# Patient Record
Sex: MALE | Race: WHITE | Employment: STUDENT | URBAN - METROPOLITAN AREA
[De-identification: names, ages, dates, MRNs, and addresses within clinical notes are randomized per-mention and may not be internally consistent; named-entity substitution may affect disease eponyms.]

---
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
## 2025-07-08 ENCOUNTER — OFFICE VISIT (OUTPATIENT)
Dept: FAMILY MEDICINE CLINIC | Age: 20
End: 2025-07-08
Payer: COMMERCIAL

## 2025-07-08 VITALS
DIASTOLIC BLOOD PRESSURE: 70 MMHG | BODY MASS INDEX: 27.7 KG/M2 | HEIGHT: 69 IN | WEIGHT: 187 LBS | HEART RATE: 56 BPM | SYSTOLIC BLOOD PRESSURE: 122 MMHG | RESPIRATION RATE: 12 BRPM

## 2025-07-08 DIAGNOSIS — Z00.00 WELL ADULT EXAM: Primary | ICD-10-CM

## 2025-07-08 PROCEDURE — 99385 PREV VISIT NEW AGE 18-39: CPT | Performed by: NURSE PRACTITIONER

## 2025-07-08 SDOH — ECONOMIC STABILITY: FOOD INSECURITY: WITHIN THE PAST 12 MONTHS, YOU WORRIED THAT YOUR FOOD WOULD RUN OUT BEFORE YOU GOT MONEY TO BUY MORE.: NEVER TRUE

## 2025-07-08 SDOH — ECONOMIC STABILITY: FOOD INSECURITY: WITHIN THE PAST 12 MONTHS, THE FOOD YOU BOUGHT JUST DIDN'T LAST AND YOU DIDN'T HAVE MONEY TO GET MORE.: NEVER TRUE

## 2025-07-08 ASSESSMENT — ENCOUNTER SYMPTOMS
SHORTNESS OF BREATH: 0
NAUSEA: 0
COUGH: 0
ABDOMINAL PAIN: 0

## 2025-07-08 ASSESSMENT — PATIENT HEALTH QUESTIONNAIRE - PHQ9
SUM OF ALL RESPONSES TO PHQ QUESTIONS 1-9: 0
1. LITTLE INTEREST OR PLEASURE IN DOING THINGS: NOT AT ALL
2. FEELING DOWN, DEPRESSED OR HOPELESS: NOT AT ALL
SUM OF ALL RESPONSES TO PHQ QUESTIONS 1-9: 0

## 2025-07-08 NOTE — PROGRESS NOTES
Subjective:      Patient ID: Tony Escudero 2005 is a 19 y.o. male here for evaluation.     Chief Complaint   Patient presents with    Establish Care     Previous seen in 2021    Annual Exam     Requesting labs--doing an ultra-marathon in September.  Currently in  in [place]       Denies CP, SOB or chest tightness    Will be doing an Ultra Marathon - 50 miles.  Training 30-40 miles with no issues.    Denies CP, SOB or chest tightness.  Denies joint pain.     BP wnl    Vitals:    07/08/25 0944   BP: 122/70   Pulse: 56   Resp: 12       Due for labs    No results found for: \"LABA1C\"  No results found for: \"EAG\"    No components found for: \"CHLPL\"  No results found for: \"TRIG\"  No results found for: \"HDL\"  No components found for: \"LDLCALC\"  No components found for: \"LABVLDL\"      Chemistry        Component Value Date/Time     09/14/2021 1039    K 4.5 09/14/2021 1039     09/14/2021 1039    CO2 26 09/14/2021 1039    BUN 12 09/14/2021 1039    CREATININE 1.0 09/14/2021 1039        Component Value Date/Time    CALCIUM 10.0 09/14/2021 1039            No results found for: \"TSH\", \"G0AVAVA\", \"THYROIDAB\"    Lab Results   Component Value Date    WBC 6.3 09/14/2021    HGB 15.6 09/14/2021    HCT 46.3 09/14/2021    MCV 91.9 09/14/2021     09/14/2021         Health Maintenance   Topic Date Due    Hepatitis A vaccine (2 of 2 - 2-dose series) 05/27/2014    Depression Screen  Never done    HIV screen  Never done    Meningococcal B vaccine (1 of 2 - Standard) Never done    Hepatitis C screen  Never done    COVID-19 Vaccine (3 - 2024-25 season) 09/01/2024    Flu vaccine (1) 08/01/2025    DTaP/Tdap/Td vaccine (7 - Td or Tdap) 06/01/2026    Hepatitis B vaccine  Completed    Hib vaccine  Completed    HPV vaccine  Completed    Polio vaccine  Completed    Varicella vaccine  Completed    Meningococcal (ACWY) vaccine  Completed    Pneumococcal 0-49 years Vaccine  Aged Out    Measles,Mumps,Rubella (MMR) vaccine

## 2025-07-09 LAB
ALBUMIN: 4.7 G/DL (ref 3.5–5.2)
ALK PHOSPHATASE: 60 U/L (ref 39–118)
ALT SERPL-CCNC: 14 U/L (ref 5–41)
ANION GAP SERPL CALCULATED.3IONS-SCNC: 11 MMOL/L (ref 7–16)
AST SERPL-CCNC: 18 U/L (ref 9–50)
BASOPHILS ABSOLUTE: 0.04 K/UL (ref 0–0.2)
BASOPHILS RELATIVE PERCENT: 0.9 % (ref 0–2)
BILIRUB SERPL-MCNC: 0.7 MG/DL
BUN BLDV-MCNC: 20 MG/DL (ref 6–20)
CALCIUM SERPL-MCNC: 10 MG/DL (ref 8.6–10.5)
CHLORIDE BLD-SCNC: 103 MMOL/L (ref 96–107)
CHOLESTEROL, TOTAL: 171 MG/DL (ref 100–199)
CHOLESTEROL/HDL RATIO: 2.8 (ref 2–4.5)
CO2: 27 MMOL/L (ref 18–32)
CREAT SERPL-MCNC: 1.29 MG/DL (ref 0.67–1.3)
EGFR IF NONAFRICAN AMERICAN: 81 ML/MIN/1.73M2
EOSINOPHILS ABSOLUTE: 0.06 K/UL (ref 0–0.8)
EOSINOPHILS RELATIVE PERCENT: 1.4 % (ref 0–5)
GLUCOSE: 89 MG/DL (ref 70–100)
HCT VFR BLD CALC: 46.8 % (ref 39–52)
HDLC SERPL-MCNC: 62 MG/DL
HEMOGLOBIN: 15.6 G/DL (ref 13–18)
IMMATURE GRANS (ABS): 0.01 K/UL (ref 0–0.06)
IMMATURE GRANULOCYTES %: 0.2 % (ref 0–2)
LDL CHOLESTEROL: 100 MG/DL
LDL/HDL RATIO: 1.6
LYMPHOCYTES ABSOLUTE: 1.97 K/UL (ref 0.9–5.2)
LYMPHOCYTES RELATIVE PERCENT: 44.4 % (ref 20–45)
MAGNESIUM: 2.1 MG/DL (ref 1.6–2.6)
MCH RBC QN AUTO: 32.1 PG (ref 26–32)
MCHC RBC AUTO-ENTMCNC: 33.3 G/DL (ref 32–35)
MCV RBC AUTO: 96 FL (ref 75–100)
MONOCYTES ABSOLUTE: 0.48 K/UL (ref 0.1–1)
MONOCYTES RELATIVE PERCENT: 10.8 % (ref 0–13)
NEUTROPHILS ABSOLUTE: 1.88 K/UL (ref 1.9–8)
NEUTROPHILS RELATIVE PERCENT: 42.3 % (ref 45–75)
PDW BLD-RTO: 12.2 % (ref 11.2–14.8)
PLATELET # BLD: 150 THOUS/CMM (ref 140–440)
POTASSIUM SERPL-SCNC: 4.7 MMOL/L (ref 3.5–5.4)
RBC # BLD: 4.86 MILL/CMM (ref 4.4–6.1)
SEX HORMONE BINDING GLOBULIN: 52.9 NMOL/L (ref 19.3–76.4)
SODIUM BLD-SCNC: 141 MMOL/L (ref 135–148)
T4 FREE: 1.58 NG/DL (ref 0.8–1.9)
TESTOSTERONE FREE, CALC: 249.2 PG/ML (ref 48–250)
TESTOSTERONE TOTAL: 1291 NG/DL (ref 249–836)
TOTAL PROTEIN: 7.2 G/DL (ref 6–8.3)
TRIGL SERPL-MCNC: 45 MG/DL (ref 20–149)
TSH SERPL DL<=0.05 MIU/L-ACNC: 1.88 UIU/ML (ref 0.27–4.2)
VITAMIN D 25-HYDROXY: 75.1 NG/ML (ref 30–100)
VLDLC SERPL CALC-MCNC: 9 MG/DL
WBC # BLD: 4.4 THDS/CMM (ref 3.6–11)

## (undated) DEVICE — APPLIER CLP M/L SHFT DIA5MM 15 LIG LIGAMAX 5

## (undated) DEVICE — GLOVE SURG SZ 75 L12IN FNGR THK94MIL TRNSLUC YEL LTX

## (undated) DEVICE — RELOAD STPL L45MM H1.5-3.6MM REG TISS BLU GRIPPING SURF B

## (undated) DEVICE — TROCAR: Brand: KII FIOS FIRST ENTRY

## (undated) DEVICE — RELOAD STPL L45MM H1-2.6MM MESENTERY THN TISS WHT GRIPPING

## (undated) DEVICE — GENERAL LAPAROSCOPY PACK-LF: Brand: MEDLINE INDUSTRIES, INC.

## (undated) DEVICE — SUTURE MCRYL SZ 4-0 L27IN ABSRB UD L19MM PS-2 1/2 CIR PRIM Y426H

## (undated) DEVICE — SOLUTION ANTIFOG VIS SYS CLEARIFY LAPSCP

## (undated) DEVICE — BAG SPEC REM 224ML W4XL6IN DIA10MM 1 HND GYN DISP ENDOPCH

## (undated) DEVICE — Z DUPLICATE USE 2431315 SET INSUF TBNG HI FLO W/ SMK EVAC FOR PNEUMOCLEAR